# Patient Record
Sex: FEMALE
[De-identification: names, ages, dates, MRNs, and addresses within clinical notes are randomized per-mention and may not be internally consistent; named-entity substitution may affect disease eponyms.]

---

## 2017-07-08 ENCOUNTER — HEALTH MAINTENANCE LETTER (OUTPATIENT)
Age: 62
End: 2017-07-08

## 2017-10-19 ENCOUNTER — TRANSFERRED RECORDS (OUTPATIENT)
Dept: PHYSICAL THERAPY | Facility: CLINIC | Age: 62
End: 2017-10-19

## 2017-11-17 ENCOUNTER — THERAPY VISIT (OUTPATIENT)
Dept: PHYSICAL THERAPY | Facility: CLINIC | Age: 62
End: 2017-11-17
Payer: COMMERCIAL

## 2017-11-17 DIAGNOSIS — G89.29 CHRONIC RIGHT-SIDED LOW BACK PAIN WITH RIGHT-SIDED SCIATICA: Primary | ICD-10-CM

## 2017-11-17 DIAGNOSIS — M54.41 CHRONIC RIGHT-SIDED LOW BACK PAIN WITH RIGHT-SIDED SCIATICA: Primary | ICD-10-CM

## 2017-11-17 PROCEDURE — 97110 THERAPEUTIC EXERCISES: CPT | Mod: GP | Performed by: PHYSICAL THERAPIST

## 2017-11-17 PROCEDURE — 97161 PT EVAL LOW COMPLEX 20 MIN: CPT | Mod: GP | Performed by: PHYSICAL THERAPIST

## 2017-11-17 NOTE — PROGRESS NOTES
[facility] Initial Evaluation -- Lumbar    Date: November 17, 2017  Soo Azevedo is a 62 year old female with a lumbar condition.   Referral: primary care  Work mechanical stresses:  Sitting    Employment status:    Leisure mechanical stresses: n/a  Functional disability score (JOSH/STarT Back):  See JOSH in flowsheet  VAS score (0-10): 5/10  Patient goals/expectations:  Decrease pain    HISTORY:    Present symptoms: low back pain across the back, pain down to R knee,  Pain quality (sharp/shooting/stabbing/aching/burning/cramping):  achey in back, sharp with certain movements   Paresthesia (yes/no):  Can get tingling B legs down to feet    Present since (onset date): about two months (Sep 2017).     Symptoms (improving/unchanging/worsening):  worsening.     Symptoms commenced as a result of: not sure   Condition occurred in the following environment:   n/a     Symptoms at onset (back/thigh/leg): low back soreness  Constant symptoms (back/thigh/leg): low back pain, R thigh pain  Intermittent symptoms (back/thigh/leg): tingling in legs    Symptoms are made worse with the following: Always Bending, Time of day - No effect and Other - Lifting   Symptoms are made better with the following: Sometimes Walking and Other - on the move, stretching sometimes in longsitting    Disturbed sleep (yes/no):  yes Sleeping postures (prone/sup/side R/L): either side    Previous episodes (0/1-5/6-10/11+): one year ago recalls lifting dog repetitively and back bothered but went away on its own Year of first episode: 2016    Previous history: none  Previous treatments: none      Specific Questions:  Cough/Sneeze/Strain (pos/neg): no  Bowel/Bladder (normal/abnormal): no  Gait (normal/abnormal): normal  Medications (nil/NSAIDS/analg/steroids/anticoag/other):  Other - Sleep and Gabapentin (for R arm)  Medical allergies:  sulfa  General health (excellent/good/fair/poor):  excellent  Pertinent  "medical history:  Smoking and Sleep disorder/Apnea  Imaging (None/Xray/MRI/Other):  MRI (DDD with L4 \"pinched nerve\")  Recent or major surgery (yes/no):  Benign neck lump removed 2 years ago, hysterectomy 25 yrs ago  Night pain (yes/no): no  Accidents (yes/no): no  Unexplained weight loss (yes/no): no  Barriers at home: no  Other red flags: no    EXAMINATION    Posture:   Sitting (good/fair/poor): fair  Standing (good/fair/poor):fair  Lordosis (red/acc/normal): red  Correction of posture (better/worse/no effect): better    Lateral Shift (right/left/nil): nil  Relevant (yes/no):  n/a  Other Observations: none    Neurological:    Motor deficit:  none  Reflexes:  n/a  Sensory deficit:  none  Dural signs:  n/a    Movement Loss:   Johan Mod Min Nil Pain   Flexion   x  Inc Low back   Extension  x   NE   Side Gliding R    x Inc Low back   Side Gliding L    x Inc Low back     Test Movements:   During: produces, abolishes, increases, decreases, no effect, centralizing, peripheralizing   After: better, worse, no better, no worse, no effect, centralized, peripheralized    Pretest symptoms standing:    Symptoms During Symptoms After ROM increased ROM decreased No Effect   FIS        Rep FIS        EIS        Rep EIS        Pretest symptoms lying: slight decrease in low back/leg (2-3/10    Symptoms During Symptoms After ROM increased ROM decreased No Effect   JESSICA        Rep JESSICA        EIL No Effect No Effect      Rep EIL Decreases (abolished low back, decreases R leg) Better x     If required, pretest symptoms:    Symptoms During Symptoms After ROM increased ROM decreased No Effect   SGIS - R        Rep SGIS - R        SGIS - L        Rep SGIS - L          Static Tests:  Sitting slouched:    Sitting erect:    Standing slouched   Standing erect:    Lying prone in extension:  Increases R leg tingling Long sitting:      Other Tests:     Provisional Classification:  Derangement - Asymmetrical, unilateral, symptoms below " knee    Principle of Management:  Education:  R lumbar below knee derangement   Equipment provided:    Mechanical therapy (Y/N):  Y   Extension principle:  EIL 10 reps every 2-3 hrs  Lateral Principle:    Flexion principle:    Other:      ASSESSMENT/PLAN:    Patient is a 62 year old female with lumbar complaints.    Patient has the following significant findings with corresponding treatment plan.                Diagnosis 1:  R lumbar below knee derangement  Pain -  manual therapy, self management, education, directional preference exercise and home program  Decreased ROM/flexibility - manual therapy, therapeutic exercise, therapeutic activity and home program  Inflammation - self management/home program  Decreased function - therapeutic activities and home program  Impaired posture - neuro re-education, therapeutic activities and home program    Therapy Evaluation Codes:   1) History comprised of:   Personal factors that impact the plan of care:      None.    Comorbidity factors that impact the plan of care are:      None.     Medications impacting care: Pain.  2) Examination of Body Systems comprised of:   Body structures and functions that impact the plan of care:      Lumbar spine.   Activity limitations that impact the plan of care are:      Bending and Sitting.  3) Clinical presentation characteristics are:   Evolving/Changing.  4) Decision-Making    Low complexity using standardized patient assessment instrument and/or measureable assessment of functional outcome.  Cumulative Therapy Evaluation is: Low complexity.    Previous and current functional limitations:  (See Goal Flow Sheet for this information)    Short term and Long term goals: (See Goal Flow Sheet for this information)     Communication ability:  Patient appears to be able to clearly communicate and understand verbal and written communication and follow directions correctly.  Treatment Explanation - The following has been discussed with the  patient:   RX ordered/plan of care  Anticipated outcomes  Possible risks and side effects  This patient would benefit from PT intervention to resume normal activities.   Rehab potential is excellent.    Frequency:  1 X week, once daily  Duration:  for 6 weeks  Discharge Plan:  Achieve all LTG.  Independent in home treatment program.  Reach maximal therapeutic benefit.    Please refer to the daily flowsheet for treatment today, total treatment time and time spent performing 1:1 timed codes.

## 2017-11-17 NOTE — PROGRESS NOTES
HPI                        System    Physical Exam                                         Musculoskeletal:        Legs:  Red indicates tingling distribution       ROS

## 2017-11-17 NOTE — MR AVS SNAPSHOT
After Visit Summary   11/17/2017    Soo Azevedo    MRN: [id number]           Patient Information     Date Of Birth          1955        Visit Information        Provider Department      11/17/2017 9:00 AM Luis Hester, PT [facility]        Today's Diagnoses     Chronic right-sided low back pain with right-sided sciatica    -  1       Follow-ups after your visit        Your next 10 appointments already scheduled     Nov 20, 2017  4:20 PM CST   SHAILA Spine with Hattie Walls, PT   [facility] ([facility])    [address]  [facility] [id number] [phone]            Nov 27, 2017  3:00 PM CST   SHAILA Spine with Luis Hester, PT   [facility] ([facility])    [address]  [facility] [id number] [phone]              Who to contact     If you have questions or need follow up information about today's clinic visit or your schedule please contact [facility]ER[facility] directly at [phone].  Normal or non-critical lab and imaging results will be communicated to you by MyChart, letter or phone within 4 business days after the clinic has received the results. If you do not hear from us within 7 days, please contact the clinic through [employer] or phone. If you have a critical or abnormal lab result, we will notify you by phone as soon as possible.  Submit refill requests through [employer] or call your pharmacy and they will forward the refill request to us. Please allow 3 business days for your refill to be completed.          Additional Information About Your Visit        [employer] Information     [employer] gives you secure access to your electronic health record. If you see a primary care provider, you can also send messages to your care team and  make appointments. If you have questions, please call your primary care clinic.  If you do not have a primary care provider, please call [phone] and they will assist you.        Care EveryWhere ID     This is your Care EveryWhere ID. This could be used by other organizations to access your [facility] medical records  [id number]         Blood Pressure from Last 3 Encounters:   01/23/08 103/61    Weight from Last 3 Encounters:   01/23/08 55.3 kg (122 lb)              We Performed the Following     HC PT EVAL, LOW COMPLEXITY     SHAILA INITIAL EVAL REPORT     THERAPEUTIC EXERCISES        Primary Care Provider Fax #    Provider Not In System [phone]                Equal Access to Services     [facility]: Hadii andreas Villatoro, jay jay wilkinson, rose champagne, nikos barnett . So [facility] [phone].    ATENCIÓN: Si habla español, tiene a larson disposición servicios gratuitos de asistencia lingüística. Llame al [phone].    We comply with applicable federal civil rights laws and [place] laws. We do not discriminate on the basis of race, color, national origin, age, disability, sex, sexual orientation, or gender identity.            Thank you!     Thank you for choosing [facility]  for your care. Our goal is always to provide you with excellent care. Hearing back from our patients is one way we can continue to improve our services. Please take a few minutes to complete the written survey that you may receive in the mail after your visit with us. Thank you!             Your Updated Medication List - Protect others around you: Learn how to safely use, store and throw away your medicines at www.disposemymeds.org.          This list is accurate as of: 11/17/17 12:56 PM.  Always use your most recent med list.                   Brand Name Dispense Instructions for use Diagnosis    MULTIVITAL PO      1 TABLET DAILY

## 2017-11-17 NOTE — LETTER
[facility]  [address]  [facility] [id number] [phone]    2017    Re: Soo Azevedo   :   1955  MRN:  [id number]   REFERRING PHYSICIAN:   Gonzalo Cobb    [facility]    Date of Initial Evaluation: 2017  Visits:  Rxs Used: 1  Reason for Referral:  Chronic right-sided low back pain with right-sided sciatica    EVALUATION SUMMARY    [facility] Initial Evaluation -- Lumbar    Date: 2017  Soo Azevedo is a 62 year old female with a lumbar condition.   Referral: primary care  Work mechanical stresses:  Sitting    Employment status:    Leisure mechanical stresses: n/a  Functional disability score (JOSH/STarT Back):  See JOSH in flowsheet  VAS score (0-10): 5/10  Patient goals/expectations:  Decrease pain    HISTORY:    Present symptoms: low back pain across the back, pain down to R knee,  Pain quality (sharp/shooting/stabbing/aching/burning/cramping):  achey in back, sharp with certain movements   Paresthesia (yes/no):  Can get tingling B legs down to feet    Present since (onset date): about two months (Sep 2017).     Symptoms (improving/unchanging/worsening):  worsening.     Symptoms commenced as a result of: not sure   Condition occurred in the following environment:   n/a     Symptoms at onset (back/thigh/leg): low back soreness  Constant symptoms (back/thigh/leg): low back pain, R thigh pain  Intermittent symptoms (back/thigh/leg): tingling in legs    Symptoms are made worse with the following: Always Bending, Time of day - No effect and Other - Lifting     Re: Soo Azevedo   :   1955        Symptoms are made better with the following: Sometimes Walking and Other - on the move, stretching sometimes in longsitting    Disturbed sleep (yes/no):  yes Sleeping postures (prone/sup/side  "R/L): either side    Previous episodes (0/1-5/6-10/11+): one year ago recalls lifting dog repetitively and back bothered but went away on its own Year of first episode: 2016    Previous history: none  Previous treatments: none      Specific Questions:  Cough/Sneeze/Strain (pos/neg): no  Bowel/Bladder (normal/abnormal): no  Gait (normal/abnormal): normal  Medications (nil/NSAIDS/analg/steroids/anticoag/other):  Other - Sleep and Gabapentin (for R arm)  Medical allergies:  sulfa  General health (excellent/good/fair/poor):  excellent  Pertinent medical history:  Smoking and Sleep disorder/Apnea  Imaging (None/Xray/MRI/Other):  MRI (DDD with L4 \"pinched nerve\")  Recent or major surgery (yes/no):  Benign neck lump removed 2 years ago, hysterectomy 25 yrs ago  Night pain (yes/no): no  Accidents (yes/no): no  Unexplained weight loss (yes/no): no  Barriers at home: no  Other red flags: no    EXAMINATION    Posture:   Sitting (good/fair/poor): fair  Standing (good/fair/poor):fair  Lordosis (red/acc/normal): red  Correction of posture (better/worse/no effect): better    Lateral Shift (right/left/nil): nil  Relevant (yes/no):  n/a  Other Observations: none    Motor deficit:  none  Reflexes:  n/a  Sensory deficit:  none  Dural signs:  n/a          Re: Soo Azevedo   :   1955      Movement Loss:   Johan Mod Min Nil Pain   Flexion   x  Inc Low back   Extension  x   NE   Side Gliding R    x Inc Low back   Side Gliding L    x Inc Low back     Test Movements:   During: produces, abolishes, increases, decreases, no effect, centralizing, peripheralizing   After: better, worse, no better, no worse, no effect, centralized, peripheralized    Pretest symptoms standing:    Symptoms During Symptoms After ROM increased ROM decreased No Effect   FIS        Rep FIS        EIS        Rep EIS        Pretest symptoms lying: slight decrease in low back/leg (2-3/10    Symptoms During Symptoms After ROM increased ROM decreased No " Effect   JESSICA        Rep JESSICA        EIL No Effect No Effect      Rep EIL Decreases (abolished low back, decreases R leg) Better x     If required, pretest symptoms:    Symptoms During Symptoms After ROM increased ROM decreased No Effect   SGIS - R        Rep SGIS - R        SGIS - L        Rep SGIS - L          Static Tests:  Sitting slouched:    Sitting erect:    Standing slouched   Standing erect:    Lying prone in extension:  Increases R leg tingling Long sitting:      Provisional Classification:  Derangement - Asymmetrical, unilateral, symptoms below knee    Principle of Management:  Education:  R lumbar below knee derangement   Equipment provided:    Mechanical therapy (Y/N):  Y   Re: Soo Azevedo   :   1955      Extension principle:  EIL 10 reps every 2-3 hrs  Lateral Principle:    Flexion principle:    Other:      ASSESSMENT/PLAN:  Patient is a 62 year old female with lumbar complaints.    Patient has the following significant findings with corresponding treatment plan.                Diagnosis 1:  R lumbar below knee derangement  Pain -  manual therapy, self management, education, directional preference exercise and home program  Decreased ROM/flexibility - manual therapy, therapeutic exercise, therapeutic activity and home program  Inflammation - self management/home program  Decreased function - therapeutic activities and home program  Impaired posture - neuro re-education, therapeutic activities and home program    Therapy Evaluation Codes:   1) History comprised of:   Personal factors that impact the plan of care:      None.    Comorbidity factors that impact the plan of care are:      None.     Medications impacting care: Pain.  2) Examination of Body Systems comprised of:   Body structures and functions that impact the plan of care:      Lumbar spine.   Activity limitations that impact the plan of care are:      Bending and Sitting.  3) Clinical presentation characteristics  are:   Evolving/Changing.  4) Decision-Making    Low complexity using standardized patient assessment instrument and/or measureable assessment of functional outcome.  Cumulative Therapy Evaluation is: Low complexity.  Musculoskeletal:        Legs:  Red indicates tingling distribution  Re: Soo Azevedo   :   1955              Previous and current functional limitations:  (See Goal Flow Sheet for this information)    Short term and Long term goals: (See Goal Flow Sheet for this information)     Communication ability:  Patient appears to be able to clearly communicate and understand verbal and written communication and follow directions correctly.  Treatment Explanation - The following has been discussed with the patient:   RX ordered/plan of care  Anticipated outcomes  Possible risks and side effects  This patient would benefit from PT intervention to resume normal activities.   Rehab potential is excellent.    Frequency:  1 X week, once daily  Duration:  for 6 weeks  Discharge Plan:  Achieve all LTG.  Independent in home treatment program.  Reach maximal therapeutic benefit.      Thank you for your referral.        INQUIRIES  Therapist: Luis Hester DPT  [facility]  [address] [place] [address]. [address]  [facility] [address]  Phone: [phone]  Fax: [phone]

## 2017-12-26 PROBLEM — G89.29 CHRONIC RIGHT-SIDED LOW BACK PAIN WITH RIGHT-SIDED SCIATICA: Status: RESOLVED | Noted: 2017-11-17 | Resolved: 2017-12-26

## 2017-12-26 PROBLEM — M54.41 CHRONIC RIGHT-SIDED LOW BACK PAIN WITH RIGHT-SIDED SCIATICA: Status: RESOLVED | Noted: 2017-11-17 | Resolved: 2017-12-26

## 2017-12-26 NOTE — PROGRESS NOTES
Please refer to initial evaluation for discharge objective status.  Pt did not return to therapy after first visit.

## 2019-10-05 ENCOUNTER — HEALTH MAINTENANCE LETTER (OUTPATIENT)
Age: 64
End: 2019-10-05

## 2020-11-14 ENCOUNTER — HEALTH MAINTENANCE LETTER (OUTPATIENT)
Age: 65
End: 2020-11-14

## 2021-09-12 ENCOUNTER — HEALTH MAINTENANCE LETTER (OUTPATIENT)
Age: 66
End: 2021-09-12

## 2021-11-07 ENCOUNTER — HEALTH MAINTENANCE LETTER (OUTPATIENT)
Age: 66
End: 2021-11-07

## 2022-11-19 ENCOUNTER — HEALTH MAINTENANCE LETTER (OUTPATIENT)
Age: 67
End: 2022-11-19

## 2023-11-18 ENCOUNTER — HEALTH MAINTENANCE LETTER (OUTPATIENT)
Age: 68
End: 2023-11-18

## 2024-01-27 ENCOUNTER — HEALTH MAINTENANCE LETTER (OUTPATIENT)
Age: 69
End: 2024-01-27